# Patient Record
Sex: FEMALE | Race: WHITE | NOT HISPANIC OR LATINO | URBAN - METROPOLITAN AREA
[De-identification: names, ages, dates, MRNs, and addresses within clinical notes are randomized per-mention and may not be internally consistent; named-entity substitution may affect disease eponyms.]

---

## 2020-01-01 ENCOUNTER — INPATIENT (INPATIENT)
Facility: HOSPITAL | Age: 85
LOS: 0 days | DRG: 951 | End: 2020-04-26
Attending: INTERNAL MEDICINE | Admitting: INTERNAL MEDICINE
Payer: MEDICARE

## 2020-01-01 VITALS
RESPIRATION RATE: 20 BRPM | SYSTOLIC BLOOD PRESSURE: 218 MMHG | OXYGEN SATURATION: 97 % | DIASTOLIC BLOOD PRESSURE: 118 MMHG | HEART RATE: 58 BPM

## 2020-01-01 VITALS
DIASTOLIC BLOOD PRESSURE: 80 MMHG | TEMPERATURE: 96 F | OXYGEN SATURATION: 96 % | SYSTOLIC BLOOD PRESSURE: 207 MMHG | HEART RATE: 77 BPM | RESPIRATION RATE: 16 BRPM

## 2020-01-01 DIAGNOSIS — E78.5 HYPERLIPIDEMIA, UNSPECIFIED: ICD-10-CM

## 2020-01-01 DIAGNOSIS — Z79.01 LONG TERM (CURRENT) USE OF ANTICOAGULANTS: ICD-10-CM

## 2020-01-01 DIAGNOSIS — I10 ESSENTIAL (PRIMARY) HYPERTENSION: ICD-10-CM

## 2020-01-01 DIAGNOSIS — Z66 DO NOT RESUSCITATE: ICD-10-CM

## 2020-01-01 DIAGNOSIS — R40.2443: ICD-10-CM

## 2020-01-01 DIAGNOSIS — F03.90 UNSPECIFIED DEMENTIA WITHOUT BEHAVIORAL DISTURBANCE: ICD-10-CM

## 2020-01-01 DIAGNOSIS — D72.829 ELEVATED WHITE BLOOD CELL COUNT, UNSPECIFIED: ICD-10-CM

## 2020-01-01 DIAGNOSIS — I82.409 ACUTE EMBOLISM AND THROMBOSIS OF UNSPECIFIED DEEP VEINS OF UNSPECIFIED LOWER EXTREMITY: ICD-10-CM

## 2020-01-01 DIAGNOSIS — I61.5 NONTRAUMATIC INTRACEREBRAL HEMORRHAGE, INTRAVENTRICULAR: ICD-10-CM

## 2020-01-01 DIAGNOSIS — I61.9 NONTRAUMATIC INTRACEREBRAL HEMORRHAGE, UNSPECIFIED: ICD-10-CM

## 2020-01-01 DIAGNOSIS — I63.9 CEREBRAL INFARCTION, UNSPECIFIED: ICD-10-CM

## 2020-01-01 DIAGNOSIS — G93.6 CEREBRAL EDEMA: ICD-10-CM

## 2020-01-01 DIAGNOSIS — Z51.5 ENCOUNTER FOR PALLIATIVE CARE: ICD-10-CM

## 2020-01-01 DIAGNOSIS — R29.728 NIHSS SCORE 28: ICD-10-CM

## 2020-01-01 DIAGNOSIS — Z86.718 PERSONAL HISTORY OF OTHER VENOUS THROMBOSIS AND EMBOLISM: ICD-10-CM

## 2020-01-01 DIAGNOSIS — I16.1 HYPERTENSIVE EMERGENCY: ICD-10-CM

## 2020-01-01 LAB
ALBUMIN SERPL ELPH-MCNC: 3.9 G/DL — SIGNIFICANT CHANGE UP (ref 3.3–5)
ALP SERPL-CCNC: 78 U/L — SIGNIFICANT CHANGE UP (ref 40–120)
ALT FLD-CCNC: 46 U/L — HIGH (ref 10–45)
ANION GAP SERPL CALC-SCNC: 15 MMOL/L — SIGNIFICANT CHANGE UP (ref 5–17)
APTT BLD: 33.7 SEC — SIGNIFICANT CHANGE UP (ref 27.5–36.3)
AST SERPL-CCNC: 33 U/L — SIGNIFICANT CHANGE UP (ref 10–40)
BASOPHILS # BLD AUTO: 0.08 K/UL — SIGNIFICANT CHANGE UP (ref 0–0.2)
BASOPHILS NFR BLD AUTO: 0.5 % — SIGNIFICANT CHANGE UP (ref 0–2)
BILIRUB SERPL-MCNC: 0.5 MG/DL — SIGNIFICANT CHANGE UP (ref 0.2–1.2)
BLD GP AB SCN SERPL QL: NEGATIVE — SIGNIFICANT CHANGE UP
BUN SERPL-MCNC: 27 MG/DL — HIGH (ref 7–23)
CALCIUM SERPL-MCNC: 9.7 MG/DL — SIGNIFICANT CHANGE UP (ref 8.4–10.5)
CHLORIDE SERPL-SCNC: 99 MMOL/L — SIGNIFICANT CHANGE UP (ref 96–108)
CHOLEST SERPL-MCNC: 230 MG/DL — HIGH (ref 10–199)
CO2 SERPL-SCNC: 24 MMOL/L — SIGNIFICANT CHANGE UP (ref 22–31)
CREAT SERPL-MCNC: 0.91 MG/DL — SIGNIFICANT CHANGE UP (ref 0.5–1.3)
EOSINOPHIL # BLD AUTO: 0.04 K/UL — SIGNIFICANT CHANGE UP (ref 0–0.5)
EOSINOPHIL NFR BLD AUTO: 0.2 % — SIGNIFICANT CHANGE UP (ref 0–6)
GLUCOSE SERPL-MCNC: 296 MG/DL — HIGH (ref 70–99)
HCT VFR BLD CALC: 50 % — HIGH (ref 34.5–45)
HDLC SERPL-MCNC: 89 MG/DL — SIGNIFICANT CHANGE UP
HGB BLD-MCNC: 15.9 G/DL — HIGH (ref 11.5–15.5)
IMM GRANULOCYTES NFR BLD AUTO: 0.5 % — SIGNIFICANT CHANGE UP (ref 0–1.5)
INR BLD: 1.58 — HIGH (ref 0.88–1.16)
LIPID PNL WITH DIRECT LDL SERPL: 119 MG/DL — HIGH
LYMPHOCYTES # BLD AUTO: 1.88 K/UL — SIGNIFICANT CHANGE UP (ref 1–3.3)
LYMPHOCYTES # BLD AUTO: 10.8 % — LOW (ref 13–44)
MCHC RBC-ENTMCNC: 31.5 PG — SIGNIFICANT CHANGE UP (ref 27–34)
MCHC RBC-ENTMCNC: 31.8 GM/DL — LOW (ref 32–36)
MCV RBC AUTO: 99.2 FL — SIGNIFICANT CHANGE UP (ref 80–100)
MONOCYTES # BLD AUTO: 0.91 K/UL — HIGH (ref 0–0.9)
MONOCYTES NFR BLD AUTO: 5.2 % — SIGNIFICANT CHANGE UP (ref 2–14)
NEUTROPHILS # BLD AUTO: 14.39 K/UL — HIGH (ref 1.8–7.4)
NEUTROPHILS NFR BLD AUTO: 82.8 % — HIGH (ref 43–77)
NRBC # BLD: 0 /100 WBCS — SIGNIFICANT CHANGE UP (ref 0–0)
PLATELET # BLD AUTO: 278 K/UL — SIGNIFICANT CHANGE UP (ref 150–400)
POTASSIUM SERPL-MCNC: 4.6 MMOL/L — SIGNIFICANT CHANGE UP (ref 3.5–5.3)
POTASSIUM SERPL-SCNC: 4.6 MMOL/L — SIGNIFICANT CHANGE UP (ref 3.5–5.3)
PROT SERPL-MCNC: 8.2 G/DL — SIGNIFICANT CHANGE UP (ref 6–8.3)
PROTHROM AB SERPL-ACNC: 18.3 SEC — HIGH (ref 10–12.9)
RBC # BLD: 5.04 M/UL — SIGNIFICANT CHANGE UP (ref 3.8–5.2)
RBC # FLD: 13.5 % — SIGNIFICANT CHANGE UP (ref 10.3–14.5)
RH IG SCN BLD-IMP: POSITIVE — SIGNIFICANT CHANGE UP
SARS-COV-2 RNA SPEC QL NAA+PROBE: SIGNIFICANT CHANGE UP
SODIUM SERPL-SCNC: 138 MMOL/L — SIGNIFICANT CHANGE UP (ref 135–145)
TOTAL CHOLESTEROL/HDL RATIO MEASUREMENT: 2.6 RATIO — LOW (ref 3.3–7.1)
TRIGL SERPL-MCNC: 110 MG/DL — SIGNIFICANT CHANGE UP (ref 10–149)
WBC # BLD: 17.38 K/UL — HIGH (ref 3.8–10.5)
WBC # FLD AUTO: 17.38 K/UL — HIGH (ref 3.8–10.5)

## 2020-01-01 PROCEDURE — 99222 1ST HOSP IP/OBS MODERATE 55: CPT | Mod: GC

## 2020-01-01 PROCEDURE — 70450 CT HEAD/BRAIN W/O DYE: CPT | Mod: 26

## 2020-01-01 PROCEDURE — 71045 X-RAY EXAM CHEST 1 VIEW: CPT | Mod: 26

## 2020-01-01 PROCEDURE — 93010 ELECTROCARDIOGRAM REPORT: CPT

## 2020-01-01 PROCEDURE — 99358 PROLONG SERVICE W/O CONTACT: CPT

## 2020-01-01 PROCEDURE — 99283 EMERGENCY DEPT VISIT LOW MDM: CPT

## 2020-01-01 PROCEDURE — 99291 CRITICAL CARE FIRST HOUR: CPT

## 2020-01-01 RX ORDER — HYDROMORPHONE HYDROCHLORIDE 2 MG/ML
0.2 INJECTION INTRAMUSCULAR; INTRAVENOUS; SUBCUTANEOUS
Refills: 0 | Status: DISCONTINUED | OUTPATIENT
Start: 2020-01-01 | End: 2020-01-01

## 2020-01-01 RX ADMIN — HYDROMORPHONE HYDROCHLORIDE 0.2 MILLIGRAM(S): 2 INJECTION INTRAMUSCULAR; INTRAVENOUS; SUBCUTANEOUS at 20:55

## 2020-04-26 NOTE — ED ADULT TRIAGE NOTE - CHIEF COMPLAINT QUOTE
BIBEMS from home, Last time seen normal by family 2 hours ago. Patient with hx of depression, on xarelto. Patient given narcan 1 dose in each nostril.

## 2020-04-26 NOTE — H&P ADULT - PROBLEM SELECTOR PLAN 4
- Patient hypertensive to 218/118 on presentation to ED - Patient hypertensive to 218/118 on presentation to ED  - hold home medications   - comfort measures only

## 2020-04-26 NOTE — CONSULT NOTE ADULT - SUBJECTIVE AND OBJECTIVE BOX
87 F w hx of HTN, DVT  on Xarelto- found unresponsive in bathroom about 2 hrs pta- EMS  noted pupils to be pinpoint.   Patient arrived in ED with SBP consistently above 200.  On examination she has bilateral constricted pupils.  +corneal reflex.  No response to aggressive pain stimuli.  all extremities are atonic.   PCP and ED doctor  discussed case to family - daughter. Family opted for comfort care only; No interventions.    suggest; Palliative service for comfort care.  Case was presented to Dr. Mosley.

## 2020-04-26 NOTE — ED ADULT NURSE REASSESSMENT NOTE - NS ED NURSE REASSESS COMMENT FT1
2040 pt found to have agonal respirations and HR sinus paco in 50s. Dilaudid 0.2 mg IVP administered as ordered for tachypnea as per order, see emar for details. Attempted to reach inpatient team multiple times regarding patient status without response. 2105 pt found with no pulse and no respirations, multiple pages made to inpatient team with no response, time of death called 2105 by MD Garner in ED. Charge nurse and asst nurse manager aware. Multiple attempts made by this RN and charge nurse to contact inpatient team, 2120 able to reach LEONARDA Herndon who is aware of pt's expiration. Bed board aware, pt's daughter Court notified. [place] [facility] [place] contacted 2145. Post mortem care in progress, pt to be transported to [facility] via inpatient transport.

## 2020-04-26 NOTE — H&P ADULT - NSHPPHYSICALEXAM_GEN_ALL_CORE
CONSTITUTIONAL: Unresponsive, irregular breathing but satting well on room air  NEURO: Comatose. Not speaking, not opening eyes to noxious, not following commands. Stimulus to painful stimuli on right-sided extremities. .  VITAL SIGNS:  T(C): 35.8 (04-26-20 @ 18:35), Max: 35.8 (04-26-20 @ 18:35)  T(F): 96.4 (04-26-20 @ 18:35), Max: 96.4 (04-26-20 @ 18:35)  HR: 77 (04-26-20 @ 18:35) (55 - 77)  BP: 207/80 (04-26-20 @ 18:35) (207/80 - 253/141)  BP(mean): --  RR: 16 (04-26-20 @ 18:35) (16 - 20)  SpO2: 96% (04-26-20 @ 18:35) (96% - 97%)  Wt(kg): --    PHYSICAL EXAM:    Constitutional: elderly  female resting in bed, in NAD   Head: NC/AT  Eyes:  pinpoint pupils   ENT: no nasal discharge; uvula midline, no oropharyngeal erythema or exudates; dry mucous membranes   Neck: supple; no JVD or thyromegaly  Respiratory: CTA B/L; no W/R/R, no retractions  Cardiac: +S1/S2; RRR; no M/R/G; PMI non-displaced  Gastrointestinal: abdomen soft, NT/ND; no rebound or guarding; +BSx4  Extremities: WWP, no clubbing or cyanosis; no peripheral edema  Dermatologic: skin warm, dry and intact; no rashes, wounds, or scars  Neurologic: AAOx0, non-responsive, unable to perform full neuro exam due to pt's lack of participation. Pt withdrew from painful stimuli of RUE and RLE.

## 2020-04-26 NOTE — CONSULT NOTE ADULT - SUBJECTIVE AND OBJECTIVE BOX
**STROKE CODE CONSULT NOTE**    Last known well time/Time of onset of symptoms:    HPI: 88 yo Female w/ PMH HTN, HLD, dementia, DVT on Xarelto brought in by EMS after being found down, last known well 2 hours prior to arrival. Pt lives at home. Last seen normal approx 12:30 by family. On arrival, pt with pinpoint pupils, HTN to 220s, unresponsive in ED. NIHSS 28. Stroke code called on arrival, CTH noncontrast showing regions of ICH which appear hypertensive in origin with extensive IVH in lateral, 3rd and 4th ventricles. CTA/CTP deferred.   On eval, pt nonresponsive to noxious stimulus, not speaking, not following commands. BP in /118 .   Neurosurgery contacted emergently. Pt's family contacted by ED attending regarding GOC.     PAST MEDICAL & SURGICAL HISTORY:  HTN  HLD  Dementia    FAMILY HISTORY:  Unknown    ROS:  Unable to obtain 2/2 AMS    MEDICATIONS  (STANDING):    MEDICATIONS  (PRN):      Allergies    No Known Allergies    Intolerances        Vital Signs Last 24 Hrs  T(C): --  T(F): --  HR: 60 (26 Apr 2020 15:13) (55 - 60)  BP: 253/141 (26 Apr 2020 15:13) (218/118 - 253/141)  BP(mean): --  RR: 20 (26 Apr 2020 14:55) (20 - 20)  SpO2: 97% (26 Apr 2020 14:55) (97% - 97%)    PHYSICAL EXAM:  Constitutional: Unresponsive, irregular breathing but satting well on room air  Cardiovascular: RRR  Neurologic:  -Mental status: Comatose. Not speaking, not opening eyes to noxious, not following commands  -Cranial nerves:  VII: Face is symmetric with normal eye closure and smile, no facial droop.  Motor: No movement to noxious in all 4 extremities.     NIHSS: 28  ICH: 5    Fingerstick Blood Glucose: CAPILLARY BLOOD GLUCOSE  241 (26 Apr 2020 15:36)      POCT Blood Glucose.: 241 mg/dL (26 Apr 2020 14:49)       LABS:                        15.9   17.38 )-----------( 278      ( 26 Apr 2020 15:04 )             50.0     04-26    138  |  99  |  27<H>  ----------------------------<  296<H>  4.6   |  24  |  0.91    Ca    9.7      26 Apr 2020 15:04    TPro  8.2  /  Alb  3.9  /  TBili  0.5  /  DBili  x   /  AST  33  /  ALT  46<H>  /  AlkPhos  78  04-26    PT/INR - ( 26 Apr 2020 15:04 )   PT: 18.3 sec;   INR: 1.58          PTT - ( 26 Apr 2020 15:04 )  PTT:33.7 sec          RADIOLOGY & ADDITIONAL STUDIES:  < from: CT Brain Stroke Protocol (04.26.20 @ 14:59) >  Large intraventricular hemorrhage throughout the ventricles. Marked hydrocephalus which causes diffuse mass effect on the brain.    < end of copied text >      < from: CT Brain Stroke Protocol (04.26.20 @ 14:59) >  INTERPRETATION:  I, Raimundo Woodall MD, have reviewed the images and the report and agree with the findings, with the following modifications:    Agree with the finding of large intraventricular hemorrhage extending through the lateral, third and fourth ventricles. There is resultant hydrocephalus. There is periventricular hypodensity more prominent along the right lateral ventricle which may represent transependymal flow of CSF. Agree with finding of sulcal effacement.    There is hemorrhage in the right caudate region and right periventricular region which appears separate from the intraventricular hemorrhage on the coronal and sagittal images (for example series 4 image 35 and series 5 image 35) and may represent intraparenchymal hemorrhage (likely hypertensive) with intraventricular extension.    There is bowing of the septum pellucidum to the left with approximately 6 mm of right to leftmidline shift.    Updated findings were discussed with Dr. Aly by Dr. Raimundo Woodall on 4/26/2020 3:25 PM    < end of copied text >    IV-tPA (Y/N):       N                            Bolus time:  Reason IV-tPA not given: Intracranial and intraventricular hemorrhage    ASSESSMENT/PLAN:  88 yo Female w/ PMH HTN, HLD, dementia, DVT on Xarelto brought in by EMS after being found down, last known well 2 hours prior to arrival. Pt with pinpoint pupils, HTN to 220s, unresponsive in ED. NIHSS 28. Stroke code called on arrival, CTH noncontrast showing regions of ICH which appear hypertensive in origin with extensive IVH in lateral, 3rd and 4th ventricles. CTA/CTP deferred.   Neurosurgery contacted emergently. Pt's family contacted by ED attending regarding GOC.   - pending neurosurgical recs  - case discussed with Dr. Meredith **STROKE CODE CONSULT NOTE**    Last known well time/Time of onset of symptoms:    HPI: 88 yo Female w/ PMH HTN, HLD, dementia, DVT on Xarelto brought in by EMS after being found down, last known well 2 hours prior to arrival. Pt lives at home. Last seen normal approx 12:30 by family. On arrival, pt with pinpoint pupils, HTN to 220s, unresponsive in ED. NIHSS 28. Stroke code called on arrival, CTH noncontrast showing regions of ICH which appear hypertensive in origin with extensive IVH in lateral, 3rd and 4th ventricles. CTA/CTP deferred.   On eval, pt nonresponsive to noxious stimulus, not speaking, not following commands. BP in /118 .   Neurosurgery contacted emergently. Pt's family contacted by ED attending regarding GOC, they expressed their wishes for comfort care measures only.     PAST MEDICAL & SURGICAL HISTORY:  HTN  HLD  Dementia    FAMILY HISTORY:  Unknown    ROS:  Unable to obtain 2/2 AMS    MEDICATIONS  (STANDING):    MEDICATIONS  (PRN):      Allergies    No Known Allergies    Intolerances        Vital Signs Last 24 Hrs  T(C): --  T(F): --  HR: 60 (26 Apr 2020 15:13) (55 - 60)  BP: 253/141 (26 Apr 2020 15:13) (218/118 - 253/141)  BP(mean): --  RR: 20 (26 Apr 2020 14:55) (20 - 20)  SpO2: 97% (26 Apr 2020 14:55) (97% - 97%)    PHYSICAL EXAM:  Constitutional: Unresponsive, irregular breathing but satting well on room air  Cardiovascular: RRR  Neurologic:  -Mental status: Comatose. Not speaking, not opening eyes to noxious, not following commands  -Cranial nerves:  VII: Face is symmetric with normal eye closure and smile, no facial droop.  Motor: No movement to noxious in all 4 extremities.     NIHSS: 30  ICH: 5    Fingerstick Blood Glucose: CAPILLARY BLOOD GLUCOSE  241 (26 Apr 2020 15:36)      POCT Blood Glucose.: 241 mg/dL (26 Apr 2020 14:49)       LABS:                        15.9   17.38 )-----------( 278      ( 26 Apr 2020 15:04 )             50.0     04-26    138  |  99  |  27<H>  ----------------------------<  296<H>  4.6   |  24  |  0.91    Ca    9.7      26 Apr 2020 15:04    TPro  8.2  /  Alb  3.9  /  TBili  0.5  /  DBili  x   /  AST  33  /  ALT  46<H>  /  AlkPhos  78  04-26    PT/INR - ( 26 Apr 2020 15:04 )   PT: 18.3 sec;   INR: 1.58          PTT - ( 26 Apr 2020 15:04 )  PTT:33.7 sec          RADIOLOGY & ADDITIONAL STUDIES:  < from: CT Brain Stroke Protocol (04.26.20 @ 14:59) >  Large intraventricular hemorrhage throughout the ventricles. Marked hydrocephalus which causes diffuse mass effect on the brain.    < end of copied text >      < from: CT Brain Stroke Protocol (04.26.20 @ 14:59) >  INTERPRETATION:  I, Raimundo Woodall MD, have reviewed the images and the report and agree with the findings, with the following modifications:    Agree with the finding of large intraventricular hemorrhage extending through the lateral, third and fourth ventricles. There is resultant hydrocephalus. There is periventricular hypodensity more prominent along the right lateral ventricle which may represent transependymal flow of CSF. Agree with finding of sulcal effacement.    There is hemorrhage in the right caudate region and right periventricular region which appears separate from the intraventricular hemorrhage on the coronal and sagittal images (for example series 4 image 35 and series 5 image 35) and may represent intraparenchymal hemorrhage (likely hypertensive) with intraventricular extension.    There is bowing of the septum pellucidum to the left with approximately 6 mm of right to leftmidline shift.    Updated findings were discussed with Dr. Aly by Dr. Raimundo Woodall on 4/26/2020 3:25 PM    < end of copied text >    IV-tPA (Y/N):       N                            Bolus time:  Reason IV-tPA not given: Intracranial and intraventricular hemorrhage    ASSESSMENT/PLAN:  88 yo Female w/ PMH HTN, HLD, dementia, DVT on Xarelto brought in by EMS after being found down, last known well 2 hours prior to arrival. Pt with pinpoint pupils, HTN to 220s, unresponsive in ED. NIHSS 28. Stroke code called on arrival, CTH noncontrast showing regions of ICH which appear hypertensive in origin with extensive IVH in lateral, 3rd and 4th ventricles. CTA/CTP deferred.   Neurosurgery contacted emergently. Pt's family contacted by ED attending regarding GOC. Family requested comfort care only; no neurosurgical intervention.   - recommend palliative consult  - case discussed with Dr. Meredith

## 2020-04-26 NOTE — H&P ADULT - HISTORY OF PRESENT ILLNESS
86 yo F w/ PMH HTN, HLD, dementia, DVT on Xarelto brought in by EMS after being found down, last known well 2 hours prior to arrival. Pt lives at home. Last seen normal approx 12:30 by family. On arrival, pt with pinpoint pupils, HTN to 220s, unresponsive in ED. NIHSS 28. Stroke code called on arrival, CTH noncontrast showing regions of ICH which appear hypertensive in origin with extensive IVH in lateral, 3rd and 4th ventricles. CTA/CTP deferred.   On eval, pt nonresponsive to noxious stimulus, not speaking, not following commands. BP in /118 .   Neurosurgery contacted emergently. Pt's family contacted by ED attending regarding GOC, they expressed their wishes for comfort care measures only. 86 yo F w/ PMH HTN, HLD, dementia, DVT on Xarelto brought in by EMS after being found down, last known well 2 hours prior to arrival. Pt lives at home. Last seen normal approx 12:30 by family. On arrival, pt with pinpoint pupils, HTN to 220s, unresponsive in ED. NIHSS 28. Stroke code called on arrival, CTH noncontrast showing regions of ICH which appear hypertensive in origin with extensive IVH in lateral, 3rd and 4th ventricles. CTA/CTP deferred. On eval, pt nonresponsive to noxious stimulus, not speaking, not following commands. BP in /118 . Neurosurgery contacted emergently. Pt's family contacted (Court Morales) who are out of town and they expressed their wishes for comfort care measures only and no intervention at this time. Pt will be admitted to regional medical floors for comfort care measures.

## 2020-04-26 NOTE — ED ADULT NURSE NOTE - OBJECTIVE STATEMENT
Pt. w/ Hx of HLD, depression, on Xarelto, BIBA from home for AMS x 2 hours. Given 1 dose intranasal narcan en route. Pt. is unresponsive with pinpoint pupils, slight movement in response to painful stimuli. Hypertensive in ED. Satting 96% on NC 6L.  in ED. Stroke code activated. Family contacted on phone by MD Aly, states pt. is DNR/DNI. Pt. w/ Hx of HLD, depression, on Xarelto, BIBA from home for AMS x 2 hours. Given 1 dose intranasal narcan en route. Pt. is unresponsive with pinpoint pupils, slight movement in response to painful stimuli. Hypertensive in ED. Satting 96% on NC 6L.  in ED. Skin intact. Stroke code activated. Family contacted on phone by MD Aly, states pt. is DNR/DNI.

## 2020-04-26 NOTE — H&P ADULT - NSHPLABSRESULTS_GEN_ALL_CORE
15.9   17.38 )-----------( 278      ( 26 Apr 2020 15:04 )             50.0       04-26    138  |  99  |  27<H>  ----------------------------<  296<H>  4.6   |  24  |  0.91    Ca    9.7      26 Apr 2020 15:04    TPro  8.2  /  Alb  3.9  /  TBili  0.5  /  DBili  x   /  AST  33  /  ALT  46<H>  /  AlkPhos  78  04-26      PT/INR - ( 26 Apr 2020 15:04 )   PT: 18.3 sec;   INR: 1.58          PTT - ( 26 Apr 2020 15:04 )  PTT:33.7 sec        RADIOLOGY & ADDITIONAL STUDIES:  < from: CT Brain Stroke Protocol (04.26.20 @ 14:59) >  Large intraventricular hemorrhage throughout the ventricles. Marked hydrocephalus which causes diffuse mass effect on the brain.      < from: CT Brain Stroke Protocol (04.26.20 @ 14:59) >  INTERPRETATION:  I, Raimundo Woodall MD, have reviewed the images and the report and agree with the findings, with the following modifications:    Agree with the finding of large intraventricular hemorrhage extending through the lateral, third and fourth ventricles. There is resultant hydrocephalus. There is periventricular hypodensity more prominent along the right lateral ventricle which may represent transependymal flow of CSF. Agree with finding of sulcal effacement.    There is hemorrhage in the right caudate region and right periventricular region which appears separate from the intraventricular hemorrhage on the coronal and sagittal images (for example series 4 image 35 and series 5 image 35) and may represent intraparenchymal hemorrhage (likely hypertensive) with intraventricular extension.    There is bowing of the septum pellucidum to the left with approximately 6 mm of right to leftmidline shift.    Updated findings were discussed with Dr. Aly by Dr. Raimundo Woodall on 4/26/2020 3:25 PM

## 2020-04-26 NOTE — ED PROVIDER NOTE - OBJECTIVE STATEMENT
87 F w hx of HTN, dvt on Xarelto- found unresponsive in bathroom about 2 hrs pta- ems noted pupils to be pinpt- give Narcan IN x 2 without improvement  markedly elevated BP  exac- Xarelto  allev none

## 2020-04-26 NOTE — H&P ADULT - PROBLEM SELECTOR PLAN 1
- f/u palliative care recs Spoke to pt's daughter (Court Morales [phone]) in length about pt's condition and possible prognosis. Pt's daughter expressed she would not like to be aggressive with treatment and would like comfort care measures only.  - Palliative consulted   - c/w Dilaudid .2mg Q2H   - c/w Ativan .5mg Q4H prn for seizures   -  DNR/DNI ; tracy signed in the chart

## 2020-04-26 NOTE — H&P ADULT - ASSESSMENT
88yo F w/PMH of HTN, HLD, dementia, DVT on Xarelto who was BIBEMS for LOC. On arrival, pt unresponsive with pinpoint pupils, HTN to 220s. Stroke code was called at that time, and CTH showed extensive IVH in lateral 3rd and 4th ventricles. Patient's family contacted and expressed wishes for comfort care at this time. 86yo F w/PMH of HTN, HLD, dementia, DVT on Xarelto who was BIBEMS for LOC. On arrival, pt unresponsive with pinpoint pupils, HTN to 220s. Stroke code was called at that time (NIH  Stroke Scale 28) and CTH showed extensive IVH in lateral 3rd and 4th ventricles. Patient's family contacted and expressed wishes for comfort care at this time.

## 2020-04-26 NOTE — ED PROVIDER NOTE - CRITICAL CARE PROVIDED
interpretation of diagnostic studies/documentation/conducted a detailed discussion of DNR status/telephone consultation with the patient's family/direct patient care (not related to procedure)/consultation with other physicians/consult w/ pt's family directly relating to pts condition/additional history taking

## 2020-04-26 NOTE — CHART NOTE - NSCHARTNOTEFT_GEN_A_CORE
PALLIATIVE MEDICINE COORDINATION OF CARE NOTE FOR [facility]  [  ] ED trigger    [  ] MICU trigger    [ x ] Consult    Patient will be seen as full consult within 24h. Consult requested for: ____Comfort care____    ___30___ Minutes; Start: ___605pm__  End: _635pm__, of non-face-to-face prolonged service provided that relates to (face-to-face) care that has or will occur and ongoing patient management, including one or more of the following:     - Reviewed records from other physicians or other health care professional services, including one or more of the following: other medical records and diagnostic / radiology study results     HPI:  88 yo F w/ PMH HTN, HLD, dementia, DVT on Xarelto brought in by EMS after being found down, last known well 2 hours prior to arrival. Pt lives at home. Last seen normal approx 12:30 by family. On arrival, pt with pinpoint pupils, HTN to 220s, unresponsive in ED. NIHSS 28. Stroke code called on arrival, CTH noncontrast showing regions of ICH which appear hypertensive in origin with extensive IVH in lateral, 3rd and 4th ventricles. CTA/CTP deferred.   On eval, pt nonresponsive to noxious stimulus, not speaking, not following commands. BP in /118 .   Neurosurgery contacted emergently. Pt's family contacted by ED attending regarding GOC, they expressed their wishes for comfort care measures only. (26 Apr 2020 18:05)      - Other: iStop reviewed.    NO Rx found on iStop review. Ref #: [id number]    - Other: Medication reviewed.    The patient HAS NOT used PRN's in the last 24h.    MEDICATIONS  (STANDING):    MEDICATIONS  (PRN):    - Other: Advanced directives    Full code  No advance directives found in alpha.    No family is documented in the chart at this time, but per documentation family would like to concentrate on comfort and not have any interventions.     - Other: Coordination/Plan of care     Discussed with primary team    As per primary team, family wants comfort, would document goals of care document prior and confirm that patient is a DNR/DNI. To ensure comfort would have MEWS exemption and No blood draws. Would recommend Dilaudid 0.2mg q2h IV PRN for moderate to severe pain / Tachypnea and also Ativan 0.5mg q4h IV For seizures.

## 2020-04-26 NOTE — DISCHARGE NOTE FOR THE EXPIRED PATIENT - HOSPITAL COURSE
88 yo F w/ PMH HTN, HLD, dementia, DVT on Xarelto brought in by EMS after being found down, last known well 2 hours prior to arrival. Pt lives at home. Last seen normal approx 12:30 by family. On arrival, pt with pinpoint pupils, HTN to 220s, unresponsive in ED. NIHSS 28. Stroke code called on arrival, CTH noncontrast showing regions of ICH which appear hypertensive in origin with extensive IVH in lateral, 3rd and 4th ventricles. CTA/CTP deferred. On eval, pt nonresponsive to noxious stimulus, not speaking, not following commands. BP in /118 . Neurosurgery contacted emergently. Pt's family contacted (Court Morales) who are out of town and they expressed their wishes for comfort care measures only and no intervention at this time. Pt was to be admitted to regional medical floors for comfort care measures. Patient was noted in ERHO with slowing heart rate. She was given comfort medications and passed shortly thereafter. Dr. Garner pronounced patient at 2105. Nurse spoke with daughter. 86 yo F w/ PMH HTN, HLD, dementia, DVT on Xarelto brought in by EMS after being found down, last known well 2 hours prior to arrival. Pt lives at home. Last seen normal approx 12:30 by family. On arrival, pt with pinpoint pupils, HTN to 220s, unresponsive in ED. NIHSS 28. Stroke code called on arrival, CTH noncontrast showing regions of ICH which appear hypertensive in origin with extensive IVH in lateral, 3rd and 4th ventricles. CTA/CTP deferred. On eval, pt nonresponsive to noxious stimulus, not speaking,, not following commands. BP in /118 . Neurosurgery contacted emergently. Pt's family contacted (Court Cardenason) who are out of town and they expressed their wishes for comfort care measures only and no intervention at this time. Pt was to be admitted to regional medical floors for comfort care measures. Patient was noted in ERHO with slowing heart rate. She was given comfort medications and passed shortly thereafter. Dr. Garner pronounced patient at 2105. Nurse spoke with daughter.

## 2020-04-26 NOTE — H&P ADULT - PROBLEM SELECTOR PLAN 2
- CT head showed extensive IVH in lateral 3rd and 4th ventricles.  - comfort care measures - CT head showed extensive IVH in lateral 3rd and 4th ventricles.  - neurosurgery consulted; no intervention at this time   - comfort care measures

## 2020-04-26 NOTE — H&P ADULT - PROBLEM SELECTOR PLAN 6
- WBC 17.38 likely reactive - WBC 17.38 likely reactive in the setting of acute bleed  - hold home medications   - comfort measures only

## 2020-04-26 NOTE — ED PROVIDER NOTE - CLINICAL SUMMARY MEDICAL DECISION MAKING FREE TEXT BOX
Head Bleed- unresponsive d/w HCP- Daughter Court Ortega- [phone]- it is her wishes and that of her brother that the patient be made comfort care  any signs of pain, will dose with morphine, withhold further treatment- DNR/DNI  wishes confirmed by PCP

## 2020-04-27 LAB — SARS-COV-2 RNA SPEC QL NAA+PROBE: SIGNIFICANT CHANGE UP

## 2020-05-30 NOTE — CHART NOTE - NSCHARTNOTEFT_GEN_A_CORE
I, Gayle Hallman MD attest that, to the best of my knowledge based on clinical presentation and findings documented in the medical record, this patient had cerebral edema: head CT scan demonstrated transependymal edema, hydrocephalus, intraventricular hemorrhage, sulcal effacement, and bowing of the septum pellucidum to the left with approximately 6mm right to left midline shift.

## 2020-06-02 PROCEDURE — 85610 PROTHROMBIN TIME: CPT

## 2020-06-02 PROCEDURE — 80053 COMPREHEN METABOLIC PANEL: CPT

## 2020-06-02 PROCEDURE — 99291 CRITICAL CARE FIRST HOUR: CPT | Mod: 25

## 2020-06-02 PROCEDURE — 85025 COMPLETE CBC W/AUTO DIFF WBC: CPT

## 2020-06-02 PROCEDURE — 93005 ELECTROCARDIOGRAM TRACING: CPT

## 2020-06-02 PROCEDURE — 82962 GLUCOSE BLOOD TEST: CPT

## 2020-06-02 PROCEDURE — 36415 COLL VENOUS BLD VENIPUNCTURE: CPT

## 2020-06-02 PROCEDURE — 71045 X-RAY EXAM CHEST 1 VIEW: CPT

## 2020-06-02 PROCEDURE — 80061 LIPID PANEL: CPT

## 2020-06-02 PROCEDURE — 85730 THROMBOPLASTIN TIME PARTIAL: CPT

## 2020-06-02 PROCEDURE — 86850 RBC ANTIBODY SCREEN: CPT

## 2020-06-02 PROCEDURE — 87635 SARS-COV-2 COVID-19 AMP PRB: CPT

## 2020-06-02 PROCEDURE — 86901 BLOOD TYPING SEROLOGIC RH(D): CPT

## 2020-06-02 PROCEDURE — 70450 CT HEAD/BRAIN W/O DYE: CPT
